# Patient Record
Sex: FEMALE | Race: WHITE | NOT HISPANIC OR LATINO | URBAN - NONMETROPOLITAN AREA
[De-identification: names, ages, dates, MRNs, and addresses within clinical notes are randomized per-mention and may not be internally consistent; named-entity substitution may affect disease eponyms.]

---

## 2022-01-05 ENCOUNTER — IMPORTED ENCOUNTER (OUTPATIENT)
Dept: URBAN - NONMETROPOLITAN AREA CLINIC 1 | Facility: CLINIC | Age: 32
End: 2022-01-05

## 2022-01-05 PROBLEM — H53.022: Noted: 2022-01-05

## 2022-01-05 PROBLEM — H52.13: Noted: 2022-01-05

## 2022-01-05 PROCEDURE — S0620 ROUTINE OPHTHALMOLOGICAL EXA: HCPCS

## 2022-01-05 NOTE — PATIENT DISCUSSION
Myopia-Discussed diagnosis with patient. -Explained that people who are myopic are at a higher risk for developing RD/RT and reviewed associated S&S.-Pt to contact our office if symptoms develop. Updated spec Rx given. Recommend lens that will provide comfort as well as protect safety and health of eyes. amblyopia oseye only able to correct to 20/30stable today

## 2022-04-09 ASSESSMENT — VISUAL ACUITY
OS_CC: J1+
OD_CC: J1+
OS_SC: 20/50
OD_SC: 20/20-1

## 2023-11-28 ENCOUNTER — COMPREHENSIVE EXAM (OUTPATIENT)
Dept: RURAL CLINIC 1 | Facility: CLINIC | Age: 33
End: 2023-11-28

## 2023-11-28 DIAGNOSIS — H53.022: ICD-10-CM

## 2023-11-28 DIAGNOSIS — H52.13: ICD-10-CM

## 2023-11-28 PROCEDURE — S0621 ROUTINE OPHTHALMOLOGICAL EXA: HCPCS

## 2023-11-28 ASSESSMENT — VISUAL ACUITY
OU_CC: 20/20
OS_CC: 20/20
OD_CC: 20/20
OS_CC: 20/40
OU_CC: 20/20
OD_CC: 20/20

## 2023-11-28 ASSESSMENT — TONOMETRY
OD_IOP_MMHG: 15
OS_IOP_MMHG: 15